# Patient Record
Sex: FEMALE | Race: WHITE | URBAN - METROPOLITAN AREA
[De-identification: names, ages, dates, MRNs, and addresses within clinical notes are randomized per-mention and may not be internally consistent; named-entity substitution may affect disease eponyms.]

---

## 2018-05-25 ENCOUNTER — TRANSFERRED RECORDS (OUTPATIENT)
Dept: HEALTH INFORMATION MANAGEMENT | Facility: CLINIC | Age: 58
End: 2018-05-25

## 2018-05-25 LAB
CREAT SERPL-MCNC: 0.82 MG/DL (ref 0.57–1.11)
GFR SERPL CREATININE-BSD FRML MDRD: >60 ML/MIN/1.73M2
GLUCOSE SERPL-MCNC: 104 MG/DL (ref 65–99)
PHQ9 SCORE: 6
POTASSIUM SERPL-SCNC: 3.9 MMOL/L (ref 3.5–5)

## 2018-06-03 PROBLEM — N85.02 COMPLEX ATYPICAL ENDOMETRIAL HYPERPLASIA: Status: ACTIVE | Noted: 2018-06-03

## 2018-06-03 PROBLEM — E78.5 HYPERLIPIDEMIA: Status: ACTIVE | Noted: 2018-06-03

## 2018-06-03 PROBLEM — E03.9 HYPOTHYROIDISM: Status: ACTIVE | Noted: 2018-06-03

## 2018-06-03 PROBLEM — G47.30 SLEEP APNEA: Status: ACTIVE | Noted: 2018-06-03

## 2018-06-03 PROBLEM — E66.01 MORBID OBESITY WITH BMI OF 40.0-44.9, ADULT (H): Status: ACTIVE | Noted: 2018-06-03

## 2018-06-03 PROBLEM — I10 HTN (HYPERTENSION): Status: ACTIVE | Noted: 2018-06-03

## 2018-06-06 RX ORDER — DOXYCYCLINE 100 MG/1
100 CAPSULE ORAL DAILY
COMMUNITY

## 2018-06-07 ENCOUNTER — HOSPITAL ENCOUNTER (OUTPATIENT)
Facility: CLINIC | Age: 58
Discharge: HOME | End: 2018-06-08
Attending: OBSTETRICS & GYNECOLOGY | Admitting: OBSTETRICS & GYNECOLOGY
Payer: COMMERCIAL

## 2018-06-07 ENCOUNTER — ANESTHESIA EVENT (OUTPATIENT)
Dept: SURGERY | Facility: CLINIC | Age: 58
End: 2018-06-07
Payer: COMMERCIAL

## 2018-06-07 ENCOUNTER — SURGERY (OUTPATIENT)
Age: 58
End: 2018-06-07

## 2018-06-07 ENCOUNTER — ANESTHESIA (OUTPATIENT)
Dept: SURGERY | Facility: CLINIC | Age: 58
End: 2018-06-07
Payer: COMMERCIAL

## 2018-06-07 DIAGNOSIS — B37.31 CANDIDIASIS OF VAGINA: ICD-10-CM

## 2018-06-07 DIAGNOSIS — N85.02 COMPLEX ATYPICAL ENDOMETRIAL HYPERPLASIA: Primary | ICD-10-CM

## 2018-06-07 PROBLEM — N85.01 COMPLEX ENDOMETRIAL HYPERPLASIA: Status: ACTIVE | Noted: 2018-06-07

## 2018-06-07 LAB — POTASSIUM SERPL-SCNC: 3.6 MMOL/L (ref 3.4–5.3)

## 2018-06-07 PROCEDURE — 88307 TISSUE EXAM BY PATHOLOGIST: CPT | Performed by: OBSTETRICS & GYNECOLOGY

## 2018-06-07 PROCEDURE — 25000128 H RX IP 250 OP 636: Performed by: NURSE ANESTHETIST, CERTIFIED REGISTERED

## 2018-06-07 PROCEDURE — 36000087 ZZH SURGERY LEVEL 8 EA 15 ADDTL MIN: Performed by: OBSTETRICS & GYNECOLOGY

## 2018-06-07 PROCEDURE — 36000085 ZZH SURGERY LEVEL 8 1ST 30 MIN: Performed by: OBSTETRICS & GYNECOLOGY

## 2018-06-07 PROCEDURE — 37000008 ZZH ANESTHESIA TECHNICAL FEE, 1ST 30 MIN: Performed by: OBSTETRICS & GYNECOLOGY

## 2018-06-07 PROCEDURE — 37000009 ZZH ANESTHESIA TECHNICAL FEE, EACH ADDTL 15 MIN: Performed by: OBSTETRICS & GYNECOLOGY

## 2018-06-07 PROCEDURE — 25000125 ZZHC RX 250: Performed by: NURSE ANESTHETIST, CERTIFIED REGISTERED

## 2018-06-07 PROCEDURE — 71000012 ZZH RECOVERY PHASE 1 LEVEL 1 FIRST HR: Performed by: OBSTETRICS & GYNECOLOGY

## 2018-06-07 PROCEDURE — 40000170 ZZH STATISTIC PRE-PROCEDURE ASSESSMENT II: Performed by: OBSTETRICS & GYNECOLOGY

## 2018-06-07 PROCEDURE — 36415 COLL VENOUS BLD VENIPUNCTURE: CPT | Performed by: ANESTHESIOLOGY

## 2018-06-07 PROCEDURE — 25000566 ZZH SEVOFLURANE, EA 15 MIN: Performed by: OBSTETRICS & GYNECOLOGY

## 2018-06-07 PROCEDURE — 25000128 H RX IP 250 OP 636: Performed by: OBSTETRICS & GYNECOLOGY

## 2018-06-07 PROCEDURE — 88331 PATH CONSLTJ SURG 1 BLK 1SPC: CPT | Performed by: OBSTETRICS & GYNECOLOGY

## 2018-06-07 PROCEDURE — 27210794 ZZH OR GENERAL SUPPLY STERILE: Performed by: OBSTETRICS & GYNECOLOGY

## 2018-06-07 PROCEDURE — 25000132 ZZH RX MED GY IP 250 OP 250 PS 637: Performed by: NURSE ANESTHETIST, CERTIFIED REGISTERED

## 2018-06-07 PROCEDURE — 25000128 H RX IP 250 OP 636: Performed by: NURSE PRACTITIONER

## 2018-06-07 PROCEDURE — P9041 ALBUMIN (HUMAN),5%, 50ML: HCPCS | Performed by: NURSE ANESTHETIST, CERTIFIED REGISTERED

## 2018-06-07 PROCEDURE — 71000013 ZZH RECOVERY PHASE 1 LEVEL 1 EA ADDTL HR: Performed by: OBSTETRICS & GYNECOLOGY

## 2018-06-07 PROCEDURE — 27210995 ZZH RX 272: Performed by: OBSTETRICS & GYNECOLOGY

## 2018-06-07 PROCEDURE — 25800025 ZZH RX 258: Performed by: OBSTETRICS & GYNECOLOGY

## 2018-06-07 PROCEDURE — 84132 ASSAY OF SERUM POTASSIUM: CPT | Performed by: ANESTHESIOLOGY

## 2018-06-07 PROCEDURE — 25000128 H RX IP 250 OP 636: Performed by: ANESTHESIOLOGY

## 2018-06-07 PROCEDURE — 88331 PATH CONSLTJ SURG 1 BLK 1SPC: CPT | Mod: 26 | Performed by: OBSTETRICS & GYNECOLOGY

## 2018-06-07 PROCEDURE — 25000125 ZZHC RX 250: Performed by: ANESTHESIOLOGY

## 2018-06-07 PROCEDURE — 25000125 ZZHC RX 250: Performed by: OBSTETRICS & GYNECOLOGY

## 2018-06-07 PROCEDURE — 25000132 ZZH RX MED GY IP 250 OP 250 PS 637: Performed by: NURSE PRACTITIONER

## 2018-06-07 PROCEDURE — 88307 TISSUE EXAM BY PATHOLOGIST: CPT | Mod: 26 | Performed by: OBSTETRICS & GYNECOLOGY

## 2018-06-07 RX ORDER — KETOROLAC TROMETHAMINE 30 MG/ML
30 INJECTION, SOLUTION INTRAMUSCULAR; INTRAVENOUS EVERY 6 HOURS PRN
Status: DISCONTINUED | OUTPATIENT
Start: 2018-06-07 | End: 2018-06-07 | Stop reason: HOSPADM

## 2018-06-07 RX ORDER — CEPHALEXIN 500 MG/1
500 CAPSULE ORAL EVERY 6 HOURS SCHEDULED
Status: DISCONTINUED | OUTPATIENT
Start: 2018-06-07 | End: 2018-06-07

## 2018-06-07 RX ORDER — PROMETHAZINE HYDROCHLORIDE 25 MG/ML
12.5 INJECTION, SOLUTION INTRAMUSCULAR; INTRAVENOUS
Status: DISCONTINUED | OUTPATIENT
Start: 2018-06-07 | End: 2018-06-07 | Stop reason: HOSPADM

## 2018-06-07 RX ORDER — HYDROMORPHONE HYDROCHLORIDE 1 MG/ML
.3-.5 INJECTION, SOLUTION INTRAMUSCULAR; INTRAVENOUS; SUBCUTANEOUS EVERY 10 MIN PRN
Status: DISCONTINUED | OUTPATIENT
Start: 2018-06-07 | End: 2018-06-07 | Stop reason: HOSPADM

## 2018-06-07 RX ORDER — DEXAMETHASONE SODIUM PHOSPHATE 4 MG/ML
INJECTION, SOLUTION INTRA-ARTICULAR; INTRALESIONAL; INTRAMUSCULAR; INTRAVENOUS; SOFT TISSUE PRN
Status: DISCONTINUED | OUTPATIENT
Start: 2018-06-07 | End: 2018-06-07

## 2018-06-07 RX ORDER — BUPIVACAINE HYDROCHLORIDE AND EPINEPHRINE 5; 5 MG/ML; UG/ML
INJECTION, SOLUTION PERINEURAL PRN
Status: DISCONTINUED | OUTPATIENT
Start: 2018-06-07 | End: 2018-06-07 | Stop reason: HOSPADM

## 2018-06-07 RX ORDER — CEFAZOLIN SODIUM 1 G/50ML
3 SOLUTION INTRAVENOUS
Status: COMPLETED | OUTPATIENT
Start: 2018-06-07 | End: 2018-06-07

## 2018-06-07 RX ORDER — DIMENHYDRINATE 50 MG/ML
12.5 INJECTION, SOLUTION INTRAMUSCULAR; INTRAVENOUS
Status: DISCONTINUED | OUTPATIENT
Start: 2018-06-07 | End: 2018-06-07 | Stop reason: HOSPADM

## 2018-06-07 RX ORDER — CEFAZOLIN SODIUM 1 G/3ML
1 INJECTION, POWDER, FOR SOLUTION INTRAMUSCULAR; INTRAVENOUS SEE ADMIN INSTRUCTIONS
Status: DISCONTINUED | OUTPATIENT
Start: 2018-06-07 | End: 2018-06-07 | Stop reason: HOSPADM

## 2018-06-07 RX ORDER — SCOLOPAMINE TRANSDERMAL SYSTEM 1 MG/1
1 PATCH, EXTENDED RELEASE TRANSDERMAL ONCE
Status: COMPLETED | OUTPATIENT
Start: 2018-06-07 | End: 2018-06-07

## 2018-06-07 RX ORDER — CEPHALEXIN 500 MG/1
500 CAPSULE ORAL EVERY 6 HOURS
Qty: 40 CAPSULE | Refills: 0 | Status: SHIPPED | OUTPATIENT
Start: 2018-06-07 | End: 2018-06-17

## 2018-06-07 RX ORDER — CEFAZOLIN SODIUM 1 G/3ML
1 INJECTION, POWDER, FOR SOLUTION INTRAMUSCULAR; INTRAVENOUS EVERY 8 HOURS
Status: COMPLETED | OUTPATIENT
Start: 2018-06-07 | End: 2018-06-07

## 2018-06-07 RX ORDER — NALOXONE HYDROCHLORIDE 0.4 MG/ML
.1-.4 INJECTION, SOLUTION INTRAMUSCULAR; INTRAVENOUS; SUBCUTANEOUS
Status: DISCONTINUED | OUTPATIENT
Start: 2018-06-07 | End: 2018-06-08 | Stop reason: HOSPADM

## 2018-06-07 RX ORDER — ONDANSETRON 4 MG/1
4 TABLET, ORALLY DISINTEGRATING ORAL EVERY 6 HOURS PRN
Status: DISCONTINUED | OUTPATIENT
Start: 2018-06-07 | End: 2018-06-08 | Stop reason: HOSPADM

## 2018-06-07 RX ORDER — ALBUTEROL SULFATE 90 UG/1
AEROSOL, METERED RESPIRATORY (INHALATION) PRN
Status: DISCONTINUED | OUTPATIENT
Start: 2018-06-07 | End: 2018-06-07

## 2018-06-07 RX ORDER — SODIUM CHLORIDE, SODIUM LACTATE, POTASSIUM CHLORIDE, CALCIUM CHLORIDE 600; 310; 30; 20 MG/100ML; MG/100ML; MG/100ML; MG/100ML
INJECTION, SOLUTION INTRAVENOUS CONTINUOUS
Status: DISCONTINUED | OUTPATIENT
Start: 2018-06-07 | End: 2018-06-07 | Stop reason: HOSPADM

## 2018-06-07 RX ORDER — ONDANSETRON 4 MG/1
4 TABLET, ORALLY DISINTEGRATING ORAL EVERY 30 MIN PRN
Status: DISCONTINUED | OUTPATIENT
Start: 2018-06-07 | End: 2018-06-07 | Stop reason: HOSPADM

## 2018-06-07 RX ORDER — FENTANYL CITRATE 50 UG/ML
25-50 INJECTION, SOLUTION INTRAMUSCULAR; INTRAVENOUS EVERY 5 MIN PRN
Status: DISCONTINUED | OUTPATIENT
Start: 2018-06-07 | End: 2018-06-07 | Stop reason: HOSPADM

## 2018-06-07 RX ORDER — IBUPROFEN 600 MG/1
600 TABLET, FILM COATED ORAL EVERY 6 HOURS PRN
Qty: 40 TABLET | Refills: 1 | Status: SHIPPED | OUTPATIENT
Start: 2018-06-07

## 2018-06-07 RX ORDER — ACETAMINOPHEN 500 MG
1000 TABLET ORAL ONCE
Status: DISCONTINUED | OUTPATIENT
Start: 2018-06-07 | End: 2018-06-07 | Stop reason: HOSPADM

## 2018-06-07 RX ORDER — LORAZEPAM 2 MG/ML
.5-1 INJECTION INTRAMUSCULAR
Status: DISCONTINUED | OUTPATIENT
Start: 2018-06-07 | End: 2018-06-07 | Stop reason: HOSPADM

## 2018-06-07 RX ORDER — MAGNESIUM HYDROXIDE 1200 MG/15ML
LIQUID ORAL PRN
Status: DISCONTINUED | OUTPATIENT
Start: 2018-06-07 | End: 2018-06-07 | Stop reason: HOSPADM

## 2018-06-07 RX ORDER — EPHEDRINE SULFATE 50 MG/ML
INJECTION, SOLUTION INTRAMUSCULAR; INTRAVENOUS; SUBCUTANEOUS PRN
Status: DISCONTINUED | OUTPATIENT
Start: 2018-06-07 | End: 2018-06-07

## 2018-06-07 RX ORDER — ONDANSETRON 2 MG/ML
4 INJECTION INTRAMUSCULAR; INTRAVENOUS EVERY 6 HOURS PRN
Status: DISCONTINUED | OUTPATIENT
Start: 2018-06-07 | End: 2018-06-08 | Stop reason: HOSPADM

## 2018-06-07 RX ORDER — PROPOFOL 10 MG/ML
INJECTION, EMULSION INTRAVENOUS CONTINUOUS PRN
Status: DISCONTINUED | OUTPATIENT
Start: 2018-06-07 | End: 2018-06-07

## 2018-06-07 RX ORDER — ONDANSETRON 2 MG/ML
4 INJECTION INTRAMUSCULAR; INTRAVENOUS EVERY 30 MIN PRN
Status: DISCONTINUED | OUTPATIENT
Start: 2018-06-07 | End: 2018-06-07 | Stop reason: HOSPADM

## 2018-06-07 RX ORDER — NALOXONE HYDROCHLORIDE 0.4 MG/ML
.1-.4 INJECTION, SOLUTION INTRAMUSCULAR; INTRAVENOUS; SUBCUTANEOUS
Status: DISCONTINUED | OUTPATIENT
Start: 2018-06-07 | End: 2018-06-07 | Stop reason: HOSPADM

## 2018-06-07 RX ORDER — PROPOFOL 10 MG/ML
INJECTION, EMULSION INTRAVENOUS PRN
Status: DISCONTINUED | OUTPATIENT
Start: 2018-06-07 | End: 2018-06-07

## 2018-06-07 RX ORDER — ALBUMIN, HUMAN INJ 5% 5 %
SOLUTION INTRAVENOUS CONTINUOUS PRN
Status: DISCONTINUED | OUTPATIENT
Start: 2018-06-07 | End: 2018-06-07

## 2018-06-07 RX ORDER — MEPERIDINE HYDROCHLORIDE 25 MG/ML
12.5 INJECTION INTRAMUSCULAR; INTRAVENOUS; SUBCUTANEOUS
Status: DISCONTINUED | OUTPATIENT
Start: 2018-06-07 | End: 2018-06-07 | Stop reason: HOSPADM

## 2018-06-07 RX ORDER — LIDOCAINE HYDROCHLORIDE 20 MG/ML
INJECTION, SOLUTION INFILTRATION; PERINEURAL PRN
Status: DISCONTINUED | OUTPATIENT
Start: 2018-06-07 | End: 2018-06-07

## 2018-06-07 RX ORDER — HYDROCODONE BITARTRATE AND ACETAMINOPHEN 5; 325 MG/1; MG/1
1-2 TABLET ORAL EVERY 4 HOURS PRN
Status: DISCONTINUED | OUTPATIENT
Start: 2018-06-07 | End: 2018-06-08 | Stop reason: HOSPADM

## 2018-06-07 RX ORDER — DEXAMETHASONE SODIUM PHOSPHATE 4 MG/ML
4 INJECTION, SOLUTION INTRA-ARTICULAR; INTRALESIONAL; INTRAMUSCULAR; INTRAVENOUS; SOFT TISSUE EVERY 10 MIN PRN
Status: DISCONTINUED | OUTPATIENT
Start: 2018-06-07 | End: 2018-06-07 | Stop reason: HOSPADM

## 2018-06-07 RX ORDER — SENNOSIDES A AND B 8.6 MG/1
1-3 TABLET, FILM COATED ORAL 2 TIMES DAILY PRN
Qty: 30 TABLET | Refills: 0 | Status: SHIPPED | OUTPATIENT
Start: 2018-06-07

## 2018-06-07 RX ORDER — ALBUTEROL SULFATE 0.83 MG/ML
2.5 SOLUTION RESPIRATORY (INHALATION)
Status: DISCONTINUED | OUTPATIENT
Start: 2018-06-07 | End: 2018-06-07 | Stop reason: HOSPADM

## 2018-06-07 RX ORDER — SODIUM CHLORIDE, SODIUM LACTATE, POTASSIUM CHLORIDE, CALCIUM CHLORIDE 600; 310; 30; 20 MG/100ML; MG/100ML; MG/100ML; MG/100ML
INJECTION, SOLUTION INTRAVENOUS CONTINUOUS PRN
Status: DISCONTINUED | OUTPATIENT
Start: 2018-06-07 | End: 2018-06-07

## 2018-06-07 RX ORDER — LIDOCAINE 40 MG/G
CREAM TOPICAL
Status: DISCONTINUED | OUTPATIENT
Start: 2018-06-07 | End: 2018-06-08 | Stop reason: HOSPADM

## 2018-06-07 RX ORDER — HYDROMORPHONE HYDROCHLORIDE 1 MG/ML
0.2 INJECTION, SOLUTION INTRAMUSCULAR; INTRAVENOUS; SUBCUTANEOUS
Status: DISCONTINUED | OUTPATIENT
Start: 2018-06-07 | End: 2018-06-08 | Stop reason: HOSPADM

## 2018-06-07 RX ORDER — HYDROCODONE BITARTRATE AND ACETAMINOPHEN 5; 325 MG/1; MG/1
1-2 TABLET ORAL EVERY 4 HOURS PRN
Qty: 15 TABLET | Refills: 0 | Status: SHIPPED | OUTPATIENT
Start: 2018-06-07

## 2018-06-07 RX ORDER — FENTANYL CITRATE 50 UG/ML
INJECTION, SOLUTION INTRAMUSCULAR; INTRAVENOUS PRN
Status: DISCONTINUED | OUTPATIENT
Start: 2018-06-07 | End: 2018-06-07

## 2018-06-07 RX ORDER — ONDANSETRON 2 MG/ML
INJECTION INTRAMUSCULAR; INTRAVENOUS PRN
Status: DISCONTINUED | OUTPATIENT
Start: 2018-06-07 | End: 2018-06-07

## 2018-06-07 RX ADMIN — PHENYLEPHRINE HYDROCHLORIDE 50 MCG: 10 INJECTION, SOLUTION INTRAMUSCULAR; INTRAVENOUS; SUBCUTANEOUS at 12:59

## 2018-06-07 RX ADMIN — PROPOFOL 30 MCG/KG/MIN: 10 INJECTION, EMULSION INTRAVENOUS at 12:48

## 2018-06-07 RX ADMIN — HYDROMORPHONE HYDROCHLORIDE 0.2 MG: 1 INJECTION, SOLUTION INTRAMUSCULAR; INTRAVENOUS; SUBCUTANEOUS at 19:26

## 2018-06-07 RX ADMIN — Medication 10 MG: at 12:49

## 2018-06-07 RX ADMIN — ALBUTEROL SULFATE 2 PUFF: 90 AEROSOL, METERED RESPIRATORY (INHALATION) at 12:49

## 2018-06-07 RX ADMIN — PHENYLEPHRINE HYDROCHLORIDE 100 MCG: 10 INJECTION, SOLUTION INTRAMUSCULAR; INTRAVENOUS; SUBCUTANEOUS at 14:14

## 2018-06-07 RX ADMIN — KETOROLAC TROMETHAMINE 30 MG: 30 INJECTION, SOLUTION INTRAMUSCULAR at 15:25

## 2018-06-07 RX ADMIN — HYDROMORPHONE HYDROCHLORIDE 0.2 MG: 1 INJECTION, SOLUTION INTRAMUSCULAR; INTRAVENOUS; SUBCUTANEOUS at 17:10

## 2018-06-07 RX ADMIN — PHENYLEPHRINE HYDROCHLORIDE 100 MCG: 10 INJECTION, SOLUTION INTRAMUSCULAR; INTRAVENOUS; SUBCUTANEOUS at 14:18

## 2018-06-07 RX ADMIN — SCOPALAMINE 1 PATCH: 1 PATCH, EXTENDED RELEASE TRANSDERMAL at 12:09

## 2018-06-07 RX ADMIN — SODIUM CHLORIDE 1000 ML: 900 IRRIGANT IRRIGATION at 13:09

## 2018-06-07 RX ADMIN — DEXAMETHASONE SODIUM PHOSPHATE 4 MG: 4 INJECTION, SOLUTION INTRA-ARTICULAR; INTRALESIONAL; INTRAMUSCULAR; INTRAVENOUS; SOFT TISSUE at 13:17

## 2018-06-07 RX ADMIN — CEFAZOLIN SODIUM 1 G: 1 INJECTION, POWDER, FOR SOLUTION INTRAMUSCULAR; INTRAVENOUS at 22:03

## 2018-06-07 RX ADMIN — HYDROCODONE BITARTRATE AND ACETAMINOPHEN 1 TABLET: 5; 325 TABLET ORAL at 22:03

## 2018-06-07 RX ADMIN — HYDROMORPHONE HYDROCHLORIDE 0.5 MG: 1 INJECTION, SOLUTION INTRAMUSCULAR; INTRAVENOUS; SUBCUTANEOUS at 14:55

## 2018-06-07 RX ADMIN — FENTANYL CITRATE 50 MCG: 50 INJECTION, SOLUTION INTRAMUSCULAR; INTRAVENOUS at 13:20

## 2018-06-07 RX ADMIN — ALBUTEROL SULFATE 2 PUFF: 90 AEROSOL, METERED RESPIRATORY (INHALATION) at 12:48

## 2018-06-07 RX ADMIN — FENTANYL CITRATE 50 MCG: 50 INJECTION, SOLUTION INTRAMUSCULAR; INTRAVENOUS at 14:21

## 2018-06-07 RX ADMIN — PHENYLEPHRINE HYDROCHLORIDE 100 MCG: 10 INJECTION, SOLUTION INTRAMUSCULAR; INTRAVENOUS; SUBCUTANEOUS at 14:03

## 2018-06-07 RX ADMIN — ALBUMIN (HUMAN): 12.5 SOLUTION INTRAVENOUS at 13:24

## 2018-06-07 RX ADMIN — BUPIVACAINE HYDROCHLORIDE AND EPINEPHRINE BITARTRATE 60 ML: 5; .005 INJECTION, SOLUTION PERINEURAL at 13:10

## 2018-06-07 RX ADMIN — FENTANYL CITRATE 100 MCG: 50 INJECTION, SOLUTION INTRAMUSCULAR; INTRAVENOUS at 12:43

## 2018-06-07 RX ADMIN — SODIUM CHLORIDE, POTASSIUM CHLORIDE, SODIUM LACTATE AND CALCIUM CHLORIDE: 600; 310; 30; 20 INJECTION, SOLUTION INTRAVENOUS at 14:12

## 2018-06-07 RX ADMIN — Medication 3 G: at 12:53

## 2018-06-07 RX ADMIN — MIDAZOLAM 2 MG: 1 INJECTION INTRAMUSCULAR; INTRAVENOUS at 12:36

## 2018-06-07 RX ADMIN — FENTANYL CITRATE 50 MCG: 50 INJECTION, SOLUTION INTRAMUSCULAR; INTRAVENOUS at 15:06

## 2018-06-07 RX ADMIN — PROPOFOL 150 MG: 10 INJECTION, EMULSION INTRAVENOUS at 12:43

## 2018-06-07 RX ADMIN — ALBUTEROL SULFATE 2 PUFF: 90 AEROSOL, METERED RESPIRATORY (INHALATION) at 12:50

## 2018-06-07 RX ADMIN — HYDROMORPHONE HYDROCHLORIDE 0.5 MG: 1 INJECTION, SOLUTION INTRAMUSCULAR; INTRAVENOUS; SUBCUTANEOUS at 15:34

## 2018-06-07 RX ADMIN — CEFAZOLIN SODIUM 1 G: 1 INJECTION, POWDER, FOR SOLUTION INTRAMUSCULAR; INTRAVENOUS at 17:08

## 2018-06-07 RX ADMIN — LIDOCAINE HYDROCHLORIDE 100 MG: 20 INJECTION, SOLUTION INFILTRATION; PERINEURAL at 12:43

## 2018-06-07 RX ADMIN — SODIUM CHLORIDE, POTASSIUM CHLORIDE, SODIUM LACTATE AND CALCIUM CHLORIDE: 600; 310; 30; 20 INJECTION, SOLUTION INTRAVENOUS at 12:35

## 2018-06-07 RX ADMIN — ONDANSETRON 4 MG: 2 INJECTION INTRAMUSCULAR; INTRAVENOUS at 13:17

## 2018-06-07 RX ADMIN — Medication 5 MG: at 14:18

## 2018-06-07 RX ADMIN — SUCCINYLCHOLINE CHLORIDE 100 MG: 20 INJECTION, SOLUTION INTRAMUSCULAR; INTRAVENOUS; PARENTERAL at 12:43

## 2018-06-07 RX ADMIN — FENTANYL CITRATE 50 MCG: 50 INJECTION, SOLUTION INTRAMUSCULAR; INTRAVENOUS at 13:21

## 2018-06-07 RX ADMIN — FENTANYL CITRATE 50 MCG: 50 INJECTION, SOLUTION INTRAMUSCULAR; INTRAVENOUS at 15:13

## 2018-06-07 RX ADMIN — SODIUM CHLORIDE 1000 ML: 900 IRRIGANT IRRIGATION at 13:10

## 2018-06-07 RX ADMIN — ROCURONIUM BROMIDE 40 MG: 10 INJECTION INTRAVENOUS at 12:55

## 2018-06-07 RX ADMIN — ROCURONIUM BROMIDE 10 MG: 10 INJECTION INTRAVENOUS at 13:21

## 2018-06-07 ASSESSMENT — LIFESTYLE VARIABLES: TOBACCO_USE: 1

## 2018-06-07 ASSESSMENT — COPD QUESTIONNAIRES: COPD: 1

## 2018-06-07 NOTE — IP AVS SNAPSHOT
MRN:[id number]                      After Visit Summary   6/7/2018    Merlyn Kirby    MRN: [id number]           Thank you!     Thank you for choosing [facility] for your care. Our goal is always to provide you with excellent care. Hearing back from our patients is one way we can continue to improve our services. Please take a few minutes to complete the written survey that you may receive in the mail after you visit with us. Thank you!        Patient Information     Date Of Birth          1960        About your hospital stay     You were admitted on:  June 7, 2018 You last received care in the:  [place] [facility] [address] [facility]    You were discharged on:  June 8, 2018       Who to Call     For medical emergencies, please call 911.  For non-urgent questions about your medical care, please call your primary care provider or clinic, [phone]  For questions related to your surgery, please call your surgery clinic        Attending Provider     Provider Mratha Gracia MD Oncology       Primary Care Provider Office Phone # Fax #    Kailee Biggs [phone]      After Care Instructions     Discharge Instructions       Discharge instructions following your gynecologic procedure:  Returning to work/activities:  -Nothing per vagina for 8 weeks  -Typically patients can expect to return to light work within 2-4 weeks.  -No driving or operating machinery while taking prescription pain medication.  -You should avoid heavy lifting until your follow up visit. No lifting greater than 20 pounds for 2 weeks.     Diet:  -as tolerated    Pain:  -Motrin (or other NSAIDs) are a good additional therapy and recommend trying this in addition to other pain relievers.  -Typically there is a minimal to moderate amount of incisional pain after surgery.  - An ice pack is recommended intermittently for the first 48 hours to help reduce pain & swelling.  -Most patients are provided  a prescription for medication to take on a short term basis to help relieve the discomfort.  -If pain medication refills are needed we require you contact our office during regular business hours. (8am-5pm Monday-Friday)  -Please be aware that pain medication can cause constipation.  It may be recommended to take an over the counter stool softener as directed to prevent constipation.     Constipation:  -The pain medication you are prescribed at the time of your surgery can cause constipation.   -We recommend that you take an over the counter stool softener such as colace or senokot-s on a regular basis until you have stopped the pain medication or bowel movements are regular. You make take 1-4 tablets twice per day.   -For constipation lasting 3 days please take Milk of Magnesia or Miralax as directed on the bottles. If you have taken Milk of Magnesia and Miralax and still have not had a bowel movement please contact your our office.    Incision/Wound care:  -Leave your steri-strips in place until your post op visit.   -If you have a clear plastic dressing over a gauze on your incision, remove these 1-2 days after discharging from the hospital.   -You many shower 24hrs after surgery.  It is ok to get the steri-strips/incision wet while showering & pat the area dry with a clean towel  -No bathtubs, hot tubs or swimming is recommended for at least 2 weeks.    Vaginal drainage/spotting:  -Following a hysterectomy you may have vaginal drainage/spotting for up to 4-6 weeks from surgery. If more than a regular period please call our office.     Bladder symptoms:  -You may also have bladder irritation of difficulty starting urination from your surgery and this is normal. Please call if you have pain or burning when you urinate or fevers.     Follow up care:  -You will be asked to see Dr. Mayes's Nurse Practitioner in 1 week and in 8 weeks.   -If you don't already have an appointment, please contact the office and our staff  "will happily assist you in scheduling your appointment. Bring your insurance card & government issued ID card to your appointment.    CALL YOUR SURGEON @[phone] FOR ANY OF THE FOLLOWING:  -Temperature greater than 101 degrees Fahrenheit  -Increased pain  -Increased shortness of breath  -Increased bleeding or drainage or vaginal bleeding greater than a regular menses.   -Pus-like drainage, increasing redness, swelling, tenderness, or warmth at the incision site  -Persistent nausea or vomiting                  Pending Results     Date and Time Order Name Status Description    2018 1359 Surgical pathology exam Preliminary             Admission Information     Date & Time Provider Department Dept. Phone    2018 Martha Mayes MD [place] [address] [facility] [phone]      Your Vitals Were     Blood Pressure Temperature Respirations Height Weight Pulse Oximetry    96/49 (BP Location: Right arm) 96.8  F (36  C) (Oral) 16 1.6 m (5' 3\") 111.1 kg (244 lb 14.4 oz) 96%    BMI (Body Mass Index)                   43.38 kg/m2           [employer] Information     [employer] lets you send messages to your doctor, view your test results, renew your prescriptions, schedule appointments and more. To sign up, go to [email or web address] . Click on \"Log in\" on the left side of the screen, which will take you to the Welcome page. Then click on \"Sign up Now\" on the right side of the page.     You will be asked to enter the access code listed below, as well as some personal information. Please follow the directions to create your username and password.     Your access code is: [email or web address]  Expires: 2018 12:32 PM     Your access code will  in 90 days. If you need help or a new code, please call your [facility] clinic or [phone].        Care EveryWhere ID     This is your Care EveryWhere ID. This could be used by other organizations to access your [facility] medical records  [id number]        Equal Access " to Services     [facility] : Hadii rodriguez Thurston, wawendyda foreign, qaybta kaalsantana galicia, luis farmer. So [facility] [phone].    ATENCIÓN: Si habla raman, tiene a rashid disposición servicios gratuitos de asistencia lingüística. Llame al [phone].    We comply with applicable federal civil rights laws and [place] laws. We do not discriminate on the basis of race, color, national origin, age, disability, sex, sexual orientation, or gender identity.               Review of your medicines      START taking        Dose / Directions    cephALEXin 500 MG capsule   Commonly known as:  KEFLEX   Indication:  Infection of the Skin and/or Related Soft Tissue   Used for:  Complex atypical endometrial hyperplasia        Dose:  500 mg   Take 1 capsule (500 mg) by mouth every 6 hours for 10 days   Quantity:  40 capsule   Refills:  0       fluconazole 150 MG tablet   Commonly known as:  DIFLUCAN   Used for:  Candidiasis of vagina        Dose:  150 mg   Take 1 tablet (150 mg) by mouth once for 1 dose   Quantity:  1 tablet   Refills:  0       HYDROcodone-acetaminophen 5-325 MG per tablet   Commonly known as:  NORCO   Used for:  Complex atypical endometrial hyperplasia        Dose:  1-2 tablet   Take 1-2 tablets by mouth every 4 hours as needed for pain maximum 10 tablet(s) per day   Quantity:  15 tablet   Refills:  0       ibuprofen 600 MG tablet   Commonly known as:  ADVIL/MOTRIN   Used for:  Complex atypical endometrial hyperplasia        Dose:  600 mg   Take 1 tablet (600 mg) by mouth every 6 hours as needed for moderate pain   Quantity:  40 tablet   Refills:  1       senna 8.6 MG tablet   Commonly known as:  SENOKOT   Used for:  Complex atypical endometrial hyperplasia        Dose:  1-3 tablet   Take 1-3 tablets by mouth 2 times daily as needed for constipation   Quantity:  30 tablet   Refills:  0         CONTINUE these medicines which have NOT CHANGED        Dose / Directions     COZAAR PO        Dose:  50 mg   Take 50 mg by mouth daily   Refills:  0       doxycycline monohydrate 100 MG capsule        Dose:  100 mg   Take 100 mg by mouth daily   Refills:  0       HYDROCHLOROTHIAZIDE PO        Dose:  50 mg   Take 50 mg by mouth daily   Refills:  0       SYNTHROID PO        Dose:  100 mcg   Take 100 mcg by mouth daily   Refills:  0       TOPROL XL PO        Dose:  50 mg   Take 50 mg by mouth daily   Refills:  0            Where to get your medicines      These medications were sent to [facility] - [address]  [address], [place] [address]     Phone:  [phone]     cephALEXin 500 MG capsule    fluconazole 150 MG tablet    senna 8.6 MG tablet         Some of these will need a paper prescription and others can be bought over the counter. Ask your nurse if you have questions.     Bring a paper prescription for each of these medications     HYDROcodone-acetaminophen 5-325 MG per tablet    ibuprofen 600 MG tablet                Protect others around you: Learn how to safely use, store and throw away your medicines at www.disposemymeds.org.        ANTIBIOTIC INSTRUCTION     You've Been Prescribed an Antibiotic - Now What?  Your healthcare team thinks that you or your loved one might have an infection. Some infections can be treated with antibiotics, which are powerful, life-saving drugs. Like all medications, antibiotics have side effects and should only be used when necessary. There are some important things you should know about your antibiotic treatment.      Your healthcare team may run tests before you start taking an antibiotic.    Your team may take samples (e.g., from your blood, urine or other areas) to run tests to look for bacteria. These test can be important to determine if you need an antibiotic at all and, if you do, which antibiotic will work best.      Within a few days, your healthcare team might change or even stop your  antibiotic.    Your team may start you on an antibiotic while they are working to find out what is making you sick.    Your team might change your antibiotic because test results show that a different antibiotic would be better to treat your infection.    In some cases, once your team has more information, they learn that you do not need an antibiotic at all. They may find out that you don't have an infection, or that the antibiotic you're taking won't work against your infection. For example, an infection caused by a virus can't be treated with antibiotics. Staying on an antibiotic when you don't need it is more likely to be harmful than helpful.      You may experience side effects from your antibiotic.    Like all medications, antibiotics have side effects. Some of these can be serious.    Let you healthcare team know if you have any known allergies when you are admitted to the hospital.    One significant side effect of nearly all antibiotics is the risk of severe and sometimes deadly diarrhea caused by Clostridium difficile (C. Difficile). This occurs when a person takes antibiotics because some good germs are destroyed. Antibiotic use allows C. diificile to take over, putting patients at high risk for this serious infection.    As a patient or caregiver, it is important to understand your or your loved one's antibiotic treatment. It is especially important for caregivers to speak up when patients can't speak for themselves. Here are some important questions to ask your healthcare team.    What infection is this antibiotic treating and how do you know I have that infection?    What side effects might occur from this antibiotic?    How long will I need to take this antibiotic?    Is it safe to take this antibiotic with other medications or supplements (e.g., vitamins) that I am taking?     Are there any special directions I need to know about taking this antibiotic? For example, should I take it with  food?    How will I be monitored to know whether my infection is responding to the antibiotic?    What tests may help to make sure the right antibiotic is prescribed for me?      Information provided by:  www.cdc.gov/getsmart  U.S. Department of Health and Human Services  Centers for disease Control and Prevention  National Center for Emerging and Zoonotic Infectious Diseases  Division of Healthcare Quality Promotion        Information about OPIOIDS     PRESCRIPTION OPIOIDS: WHAT YOU NEED TO KNOW   You have a prescription for an opioid (narcotic) pain medicine. Opioids can cause addiction. If you have a history of chemical dependency of any type, you are at a higher risk of becoming addicted to opioids. Only take this medicine after all other options have been tried. Take it for as short a time and as few doses as possible.     Do not:    Drive. If you drive while taking these medicines, you could be arrested for driving under the influence (DUI).    Operate heavy machinery    Do any other dangerous activities while taking these medicines.     Drink any alcohol while taking these medicines.      Take with any other medicines that contain acetaminophen. Read all labels carefully. Look for the word  acetaminophen  or  Tylenol.  Ask your pharmacist if you have questions or are unsure.    Store your pills in a secure place, locked if possible. We will not replace any lost or stolen medicine. If you don t finish your medicine, please throw away (dispose) as directed by your pharmacist. The [place] Pollution Control Agency has more information about safe disposal: https://www.pca.[facility].[place].us/living-green/managing-unwanted-medications    All opioids tend to cause constipation. Drink plenty of water and eat foods that have a lot of fiber, such as fruits, vegetables, prune juice, apple juice and high-fiber cereal. Take a laxative (Miralax, milk of magnesia, Colace, Senna) if you don t move your bowels at least every other  day.              Medication List: This is a list of all your medications and when to take them. Check marks below indicate your daily home schedule. Keep this list as a reference.      Medications           Morning Afternoon Evening Bedtime As Needed    cephALEXin 500 MG capsule   Commonly known as:  KEFLEX   Take 1 capsule (500 mg) by mouth every 6 hours for 10 days                                COZAAR PO   Take 50 mg by mouth daily                                doxycycline monohydrate 100 MG capsule   Take 100 mg by mouth daily                                fluconazole 150 MG tablet   Commonly known as:  DIFLUCAN   Take 1 tablet (150 mg) by mouth once for 1 dose                                HYDROCHLOROTHIAZIDE PO   Take 50 mg by mouth daily                                HYDROcodone-acetaminophen 5-325 MG per tablet   Commonly known as:  NORCO   Take 1-2 tablets by mouth every 4 hours as needed for pain maximum 10 tablet(s) per day   Last time this was given:  2 tablets on 6/8/2018 11:30 AM                                ibuprofen 600 MG tablet   Commonly known as:  ADVIL/MOTRIN   Take 1 tablet (600 mg) by mouth every 6 hours as needed for moderate pain                                senna 8.6 MG tablet   Commonly known as:  SENOKOT   Take 1-3 tablets by mouth 2 times daily as needed for constipation                                SYNTHROID PO   Take 100 mcg by mouth daily                                TOPROL XL PO   Take 50 mg by mouth daily

## 2018-06-07 NOTE — OR NURSING
Patient states she has a pimple on her right inner thigh.  Patient states it is closed and not draining.

## 2018-06-07 NOTE — ANESTHESIA PREPROCEDURE EVALUATION
Anesthesia Evaluation     . Pt has had prior anesthetic. Type: General    History of anesthetic complications   - PONV        ROS/MED HX    ENT/Pulmonary:     (+)sleep apnea, tobacco use, Current use COPD, uses CPAP , . .    Neurologic:  - neg neurologic ROS     Cardiovascular:     (+) Dyslipidemia, hypertension----. : . . . :. .       METS/Exercise Tolerance:     Hematologic:  - neg hematologic  ROS       Musculoskeletal:         GI/Hepatic:        (-) GERD   Renal/Genitourinary:         Endo: Comment: BMI 43    (+) thyroid problem hypothyroidism, Obesity, .      Psychiatric:     (+) psychiatric history depression      Infectious Disease:         Malignancy:         Other:                     Physical Exam  Normal systems: dental    Airway   Mallampati: III  TM distance: >3 FB  Neck ROM: full  Comment: Anterior larynx    Dental     Cardiovascular   Rhythm and rate: regular      Pulmonary    breath sounds clear to auscultation                    Anesthesia Plan      History & Physical Review  History and physical reviewed and following examination; no interval change.    ASA Status:  3 .    NPO Status:  > 8 hours    Plan for General and ETT with Intravenous and Propofol induction. Maintenance will be Balanced.    PONV prophylaxis:  Ondansetron (or other 5HT-3), Dexamethasone or Solumedrol and Scopolamine patch  Plan on DL up front, with glidescope as needed      Postoperative Care  Postoperative pain management:  Multi-modal analgesia.      Consents  Anesthetic plan, risks, benefits and alternatives discussed with:  Patient..        Procedure: Procedure(s):  COMBINED DAVINCI XI HYSTERECTOMY TOTAL, SALPINGO-OOPHORECTOMY, NODE DISSECTION, TUMOR STAGING  LAPAROTOMY EXPLORATORY  Preop diagnosis: COMPLEX ENDOMETRIAL HYPERPLASIA AND POSSIBLE ATYPIA     Allergies   Allergen Reactions     Lisinopril Other (See Comments)     ANGIOEDEMA     Ativan [Lorazepam] Other (See Comments)     CONFUSION     Past Medical History:    Diagnosis Date     Complex endometrial hyperplasia      Past Surgical History:   Procedure Laterality Date     GI SURGERY      SIGMOID COLECTOMY     GYN SURGERY      HYSTEROSCOPY  D+C     GYN SURGERY       SUCTION D+C     GYN SURGERY       TUBAL LIGATION     HERNIA REPAIR       ORTHOPEDIC SURGERY Right     ELBOW BURSECTOMY     Social History   Substance Use Topics     Smoking status: Current Every Day Smoker     Packs/day: 0.75     Years: 20.00     Types: Cigarettes     Smokeless tobacco: Never Used     Alcohol use Yes      Comment: 2 BOTTLES OF WINE / WEEK     Prior to Admission medications    Medication Sig Start Date End Date Taking? Authorizing Provider   doxycycline monohydrate 100 MG capsule Take 100 mg by mouth daily   Yes Reported, Patient   Levothyroxine Sodium (SYNTHROID PO) Take 100 mcg by mouth daily   Yes Reported, Patient   Losartan Potassium (COZAAR PO) Take 50 mg by mouth daily   Yes Reported, Patient   MedroxyPROGESTERone Acetate (PROVERA PO) Take 20 mg by mouth daily   Yes Reported, Patient   Metoprolol Succinate (TOPROL XL PO) Take 50 mg by mouth daily   Yes Reported, Patient     Current Facility-Administered Medications Ordered in Epic   Medication Dose Route Frequency Last Rate Last Dose     ceFAZolin (ANCEF) 1 g vial to attach to  ml bag for ADULT or 50 ml bag for PEDS  1 g Intravenous See Admin Instructions         ceFAZolin (ANCEF) intermittent infusion 3 g (pre-mix)  3 g Intravenous Pre-Op/Pre-procedure x 1 dose         No current [facility]-ordered outpatient prescriptions on file.       Wt Readings from Last 1 Encounters:   06/07/18 111.1 kg (244 lb 14.4 oz)     Temp Readings from Last 1 Encounters:   06/07/18 35.7  C (96.3  F) (Oral)     BP Readings from Last 6 Encounters:   06/07/18 184/90     Pulse Readings from Last 4 Encounters:   No data found for Pulse     Resp Readings from Last 1 Encounters:   06/07/18 16     SpO2 Readings from Last 1 Encounters:   06/07/18 96%

## 2018-06-07 NOTE — ANESTHESIA CARE TRANSFER NOTE
Patient: Merlyn Kirby    Procedure(s):  ROBOTIC ASSISTED XI TOTAL LAPAROSCOPIC HYSTERECTOMY; BILATERAL SALPINGO-OOPHORECTOMY - Wound Class: II-Clean Contaminated    Diagnosis: COMPLEX ENDOMETRIAL HYPERPLASIA AND POSSIBLE ATYPIA   Diagnosis Additional Information: No value filed.    Anesthesia Type:   General, ETT     Note:  Airway :Face Mask  Patient transferred to:PACU  Comments: Pt to PACU. VSS. Report complete to RNHandoff Report: Identifed the Patient, Identified the Reponsible Provider, Reviewed the pertinent medical history, Discussed the surgical course, Reviewed Intra-OP anesthesia mangement and issues during anesthesia, Set expectations for post-procedure period and Allowed opportunity for questions and acknowledgement of understanding      Vitals: (Last set prior to Anesthesia Care Transfer)    CRNA VITALS  6/7/2018 1416 - 6/7/2018 1446      6/7/2018             Resp Rate (set): 10                Electronically Signed By: Sivan Meadows CRNA, APRN CRNA  June 7, 2018  2:46 PM

## 2018-06-07 NOTE — OP NOTE
Procedure Date: 06/07/2018      DATE OF SERVICE:  06/07/2018      PREOPERATIVE DIAGNOSIS:  Complex atypical endometrial hyperplasia.  A body mass index of 42/morbid obesity.      POSTOPERATIVE DIAGNOSIS:  Complex atypical endometrial hyperplasia a body mass index of 42/morbid obesity.      PROCEDURE:  Robotic-assisted total laparoscopic hysterectomy, bilateral salpingo-oophorectomy, lysis of adhesions.      SURGEON:  MICHAEL Mayes MD      ASSISTANT:  BHARTI Quesada.      ANESTHESIA: General endotracheal anesthesia.      INDICATIONS FOR THE PROCEDURE:  the patient is a 57-year-old female who underwent menopause in her later 40s.  She presented complaining of postmenopausal bleeding with passage of blood and clots of 2 weeks' duration in March 2018.  She was then taken to the operating room by Dr. Nevin Castaneda and underwent a hysteroscopy to clear polypectomy and D&C.  Endometrium was thickened and fluffy.  Pathology revealed portions of the endometrium suggestive of an endometrial polyp and an inactive endometrium with pseudodecidual changes placed on hormonal therapy.  On 04/17/2018 she presented to [facility] with further vaginal bleeding after the procedure and hemoglobin continued to decrease and the bleeding continued.  She was evaluated again by Dr. Rodriguez this time and taken to the operating room.  Pelvic ultrasound had shown an irregular thickened endometrial stripe ranging from 7-12 mm.  She underwent a hysteroscopy, D&C and pathology revealed focal complex hyperplasia and a fragment of endometrial polyp. In 1 fragment of polyp there was unusual intraglandular proliferation of endometrial glands with a monotono cytology.  Definitive diagnosis of atypia was not possible.  She was given a prescription for progestin therapy if bleeding resumed.  She was then referred to me and we elected to proceed with robotic hysterectomy.  She did have a history of a prior sigmoidectomy and ventral hernia  repair and had a large midline incision.      FINDINGS:  The patient was found to have some filmy omental adhesions to the inside of the hernia repair.  She had adhesions of the sigmoid colon to the right pelvic sidewall and to the posterior aspect of the uterus and adnexa that were fairly filmy in nature and were easily taken down.      PROCEDURE IN DETAIL:  The patient was taken to the operating room.  She received broad-spectrum antibiotic prophylaxis with cephazolin. Pneumatic compression stockings were placed on her lower extremities.  She was brought to the operating room, placed in a supine position on a pink pad.  General endotracheal anesthesia was administered in the usual fashion.  She was then repositioned in low lithotomy position using Yellofin stirrups.  Her arms were padded and held at her side with a combination of draw sheet and sleds.  The shoulder braces were applied to her shoulders and a Spence was placed above her forehead.  She was prepped and draped in the usual sterile fashion including insertion of a 16-Swedish Lorenzo catheter.  She had mentioned a boil in her perineal area and it was on her right buttock cheek and this was squeezed pus and blood was exuded from the boil that she had in that area.  We infiltrated the supraumbilical area with 0.5% Marcaine with epinephrine 25 cm above the symphysis pubis in the midline.  We did this to the left of the midline in one of the lateral port places that I had marked out the previously and through this, a small nick was made in the skin and a Veress needle was introduced into the peritoneal cavity.  Once the pneumoperitoneum was established, the Veress needle was removed and replaced with an 8 mm da Albina port.  The camera was then introduced confirming intraperitoneal position and showing filmy, stringy adhesions of the omentum to the anterior abdominal wall.  Under direct visualization, 4 additional port sites were placed, an 8 mm da Albina port  was placed in the midline at 25 cm, one was placed 8 cm to the right of the midline port and one was placed 8 cm to the left of the midline port.  We then took down the filmy adhesions with EndoShears.  We then placed the patient in steep Trendelenburg and placed an 8 mm AirSeal port above the right anterior superior iliac spine and an accessory port above this and in line with the other ports.  This was a 12 mm VersaStep port because we felt that we would need the paddle retractor because of the redundancy of her bowel mesentery.  The robot was docked in the usual fashion.  Monopolar scissors were placed in the right upper robotic arm, a Maryland bipolar in the medial left upper robotic arm and a ProGrasp in the lateral left robotic arm.  The instruments were advanced into the pelvis. The paddle retractor was utilized to move the bowel out of the way.  The right round ligament was grasped and elevated with a ProGrasp was cauterized with the Maryland bipolar and divided with the monopolar scissors.  We then opened up the posterior broad ligament peritoneum lateral to the ovarian vessels.  We isolated the ovarian vessels by creating a defect of the medial aspect of the posterior broad ligament peritoneum below them and extending it towards the uterus.  The ovarian vessels were cauterized in a wide centimeter band.  They were then divided with the monopolar scissors.  We then spent some time doing lysis of adhesions getting rid of the filmy adhesions in the pelvis between the sigmoid colon, posterior uterus and adnexal structures as well as the left pelvic sidewall.  We then went to the left side and performed an identical procedure that we had on the right hand side.  The vesicouterine peritoneum was then divided and the bladder was taken down easily off the anterior surface of the cervix and upper vagina.  On both sides, the soft tissues at the isthmus were cauterized and divided with a combination of the Maryland  bipolar and the monopolar scissors and then on both sides, we cauterized and eventually once they were both cauterized  divided  the uterine arteries.  We then went down each cardinal ligament sequentially cauterizing and dividing the soft tissues away from the cervix down to and including the uterosacral ligament.  When this was completed on both sides, we then pushed the EA sizer into the anterior fornix.  The anterior colpotomy was made at the cervicovaginal junction and circumferentially the cervix was freed up from the vagina using the monopolar scissors. A single-tooth tenaculum was brought through the colpotomy and the cervix was grasped.  The cervix, uterus, tubes and ovaries were removed without incident and sent to pathology who was subsequently called and said that there was no definite malignancy present.  The vaginal cuff was closed with #1  0 PDS suture from the lateral angles, incorporating the uterosacral ligament and the cul-de-sac peritoneum and doing a full thickness anterior to posterior running closure towards the midline from either side.  The 2 sutures were tied together in the middle. The pelvis was then irrigated.  Excellent hemostasis had been obtained.  We used a 0 Vicryl suture and a fascial closure device to close the fascia of the 12 mm trocar site.  We removed the robotic instruments allowed the pneumoperitoneum to dissipate.  The robot was undocked and the trocars were removed intact.  The incisions were closed with a combination 4-0 Monocryl in an interrupted fashion to reapproximate the subcutaneous tissue and 4-0 Monocryl in a subcuticular fashion to reapproximate the skin.  This was reinforced by interrupted 4-0 Monocryl sutures across the incision given the size of her belly.  At that point her anesthesia was reversed.  She was extubated and taken to recovery room in stable condition.  Estimated blood loss 50 mL.      Cass Neil was my primary assist.  She helped with all aspects  of the case including insertion of the trocars, docking of the robot, placement of the robotic instruments.  She assisted through the accessory port site.  She was instrumental in specimen retrieval and helped with undocking of the port site, cuff closure and port site closure.         NHUNG SANABRIA MD             D: 2018   T: 2018   MT: HUMBERTO      Name:     GISEL DAMIAN   MRN:      [id number]        Account:        [id number]   :      1960           Procedure Date: 2018      Document: [id number]       cc: Nhung Castaneda DO

## 2018-06-07 NOTE — DISCHARGE SUMMARY
"HOSPITAL DISCHARGE SUMMARY    Patient Name: Merlyn Kirby  YOB: 1960 Age: 57 year old  Medical Record Number: [id number]  Primary Physician: Kailee Biggs  Phone: [phone]  Admission Date: 6/7/2018  Discharge Date: 6/8/18    Merlyn Kirby  will be discharged from [facility].    PRINCIPAL DISCHARGE DIAGNOSIS: CAH     BRIEF HOSPITAL COURSE: This 57 year old female admitted following the below listed procedure. She tolerated the procedure well. Uneventful post operative course and discharge to home on POD #1 with adequate pain control, tolerating orals, voiding and ambulating. She was discharged with a 10 day supply of Keflex for a boil on her right inner thigh.     PROCEDURES PERFORMED DURING HOSPITALIZATION:   Robotic assisted laparoscopic  Total hysterectomy  Bilateral salpingo-oophorectomy    COMPLICATIONS IN HOSPITAL: None    CONSULTATIONS:  None    PERTINENT FINDINGS/RESULTS AT DISCHARGE:   /90  Temp 96.3  F (35.7  C) (Oral)  Resp 16  Ht 1.6 m (5' 3\")  Wt 111.1 kg (244 lb 14.4 oz)  SpO2 96%  Breastfeeding? No  BMI 43.38 kg/m2    Latest Laboratory Results:  Chem:  CBC RESULTS: No results for input(s): WBC, RBC, HGB, HCT, MCV, MCH, MCHC, RDW, PLT in the last 31626 hours.  Last Basic Metabolic Panel:  No results found for: NA   Lab Results   Component Value Date    POTASSIUM 3.6 06/07/2018     No results found for: CHLORIDE  No results found for: MARTINA  No results found for: CO2  No results found for: BUN  No results found for: CR  No results found for: GLC      IMPORTANT PENDING TEST RESULTS:  Pathology    CONDITION AT DISCHARGE:    Stabilized    DISCHARGE ORDERS  Current Discharge Medication List      START taking these medications    Details   cephALEXin (KEFLEX) 500 MG capsule Take 1 capsule (500 mg) by mouth every 6 hours for 10 days  Qty: 40 capsule, Refills: 0    Associated Diagnoses: Complex atypical endometrial hyperplasia    "   HYDROcodone-acetaminophen (NORCO) 5-325 MG per tablet Take 1-2 tablets by mouth every 4 hours as needed for pain maximum 10 tablet(s) per day  Qty: 15 tablet, Refills: 0    Associated Diagnoses: Complex atypical endometrial hyperplasia      ibuprofen (ADVIL/MOTRIN) 600 MG tablet Take 1 tablet (600 mg) by mouth every 6 hours as needed for moderate pain  Qty: 40 tablet, Refills: 1    Associated Diagnoses: Complex atypical endometrial hyperplasia      senna (SENOKOT) 8.6 MG tablet Take 1-3 tablets by mouth 2 times daily as needed for constipation  Qty: 30 tablet, Refills: 0    Associated Diagnoses: Complex atypical endometrial hyperplasia         CONTINUE these medications which have NOT CHANGED    Details   doxycycline monohydrate 100 MG capsule Take 100 mg by mouth daily      HYDROCHLOROTHIAZIDE PO Take 50 mg by mouth daily      Levothyroxine Sodium (SYNTHROID PO) Take 100 mcg by mouth daily      Losartan Potassium (COZAAR PO) Take 50 mg by mouth daily      Metoprolol Succinate (TOPROL XL PO) Take 50 mg by mouth daily             DISCHARGE INSTRUCTION.      FOLLOW-UP: Merlyn Kirby should see Kailee Biggs.    Specialty follow-up: as above.     AFTER HOSPITAL RECOMMENDATIONS  As above.      Physician(s) in addition to primary physician who should receive a copy:  Kailee Biggs

## 2018-06-07 NOTE — PROCEDURES
POST OPERATIVE NOTE-IMMEDIATE :  Preoperative Diagnosis:  COMPLEX ENDOMETRIAL HYPERPLASIA AND POSSIBLE ATYPIA     Postoperative Diagnosis:  Same, no atypia     NATIONAL GUIDELINE REFERENCED FOR TREATMENT PLANNING:NCCN    Procedures:  Robotic assisted laparoscopic total hysterectomy  Bilateral salpingo oophorectomy    Prosthetic Devices:  None    Surgeon(s) and Assistants (if any):  Surgeon(s):  Martha Mayes MD  Circulator: Casey Horowitz RN  Relief Circulator: Sandra Kumar RN; Alka Griffin RN  Relief Scrub: Marge Crisostomo  Scrub Person: Carrie Brambila  First Assistant: Cass Neil APRN CNP    Anesthesia:  General    Drains:  none    Specimens:  Uterus, cervix, bilateral fallopian tubes and ovaries     Complications: none    Findings/Conclusions: Benign endometrium on frozen.     Estimated Blood Loss: 5cc     Condition on discharge from OR:  Satisfactory      Cass Neil   On Behalf of  Surgeon(s):  Martha Mayes MD

## 2018-06-07 NOTE — IP AVS SNAPSHOT
[address], [address]    [place] [address]    Phone:  [phone]                                       After Visit Summary   6/7/2018    Merlyn Kirby    MRN: [id number]           After Visit Summary Signature Page     I have received my discharge instructions, and my questions have been answered. I have discussed any challenges I see with this plan with the nurse or doctor.    ..........................................................................................................................................  Patient/Patient Representative Signature      ..........................................................................................................................................  Patient Representative Print Name and Relationship to Patient    ..................................................               ................................................  Date                                            Time    ..........................................................................................................................................  Reviewed by Signature/Title    ...................................................              ..............................................  Date                                                            Time

## 2018-06-07 NOTE — OR NURSING
Abdominal binder applied to patient to help with pain control.  New ice packs applied.  Patient much improved with pain at this time.

## 2018-06-07 NOTE — ANESTHESIA POSTPROCEDURE EVALUATION
Patient: Merlyn Kirby    Procedure(s):  ROBOTIC ASSISTED XI TOTAL LAPAROSCOPIC HYSTERECTOMY; BILATERAL SALPINGO-OOPHORECTOMY - Wound Class: II-Clean Contaminated    Diagnosis:COMPLEX ENDOMETRIAL HYPERPLASIA AND POSSIBLE ATYPIA   Diagnosis Additional Information: No value filed.    Anesthesia Type:  General, ETT    Note:  Anesthesia Post Evaluation    Patient location during evaluation: Bedside  Patient participation: Able to fully participate in evaluation  Level of consciousness: awake and alert  Pain management: adequate  Airway patency: patent  Cardiovascular status: acceptable  Respiratory status: acceptable  Hydration status: acceptable  PONV: none             Last vitals:  Vitals:    06/07/18 1620 06/07/18 1629 06/07/18 1648   BP: (!) 135/36  127/57   Resp: 13  16   Temp:   35.4  C (95.8  F)   SpO2:  97% 95%         Electronically Signed By: Duc Mitchell MD  June 7, 2018  5:41 PM

## 2018-06-08 VITALS
DIASTOLIC BLOOD PRESSURE: 49 MMHG | WEIGHT: 244.9 LBS | RESPIRATION RATE: 16 BRPM | OXYGEN SATURATION: 96 % | TEMPERATURE: 96.8 F | HEIGHT: 63 IN | SYSTOLIC BLOOD PRESSURE: 96 MMHG | BODY MASS INDEX: 43.39 KG/M2

## 2018-06-08 LAB — COPATH REPORT: NORMAL

## 2018-06-08 PROCEDURE — 25000128 H RX IP 250 OP 636: Performed by: NURSE PRACTITIONER

## 2018-06-08 PROCEDURE — 25000132 ZZH RX MED GY IP 250 OP 250 PS 637: Performed by: NURSE PRACTITIONER

## 2018-06-08 RX ORDER — LOSARTAN POTASSIUM 50 MG/1
50 TABLET ORAL DAILY
Status: DISCONTINUED | OUTPATIENT
Start: 2018-06-08 | End: 2018-06-08 | Stop reason: HOSPADM

## 2018-06-08 RX ORDER — METOPROLOL SUCCINATE 50 MG/1
50 TABLET, EXTENDED RELEASE ORAL DAILY
Status: DISCONTINUED | OUTPATIENT
Start: 2018-06-08 | End: 2018-06-08 | Stop reason: HOSPADM

## 2018-06-08 RX ORDER — HYDROCHLOROTHIAZIDE 50 MG/1
50 TABLET ORAL DAILY
Status: DISCONTINUED | OUTPATIENT
Start: 2018-06-08 | End: 2018-06-08 | Stop reason: HOSPADM

## 2018-06-08 RX ORDER — FLUCONAZOLE 150 MG/1
150 TABLET ORAL ONCE
Qty: 1 TABLET | Refills: 0 | Status: SHIPPED | OUTPATIENT
Start: 2018-06-08 | End: 2018-06-08

## 2018-06-08 RX ADMIN — HYDROCODONE BITARTRATE AND ACETAMINOPHEN 2 TABLET: 5; 325 TABLET ORAL at 11:30

## 2018-06-08 RX ADMIN — HYDROMORPHONE HYDROCHLORIDE 0.2 MG: 1 INJECTION, SOLUTION INTRAMUSCULAR; INTRAVENOUS; SUBCUTANEOUS at 04:09

## 2018-06-08 RX ADMIN — HYDROMORPHONE HYDROCHLORIDE 0.2 MG: 1 INJECTION, SOLUTION INTRAMUSCULAR; INTRAVENOUS; SUBCUTANEOUS at 00:12

## 2018-06-08 RX ADMIN — HYDROCODONE BITARTRATE AND ACETAMINOPHEN 2 TABLET: 5; 325 TABLET ORAL at 06:43

## 2018-06-08 NOTE — PLAN OF CARE
Problem: Patient Care Overview  Goal: Plan of Care/Patient Progress Review  Outcome: No Change  Pt A&O x4. POD0 hysterectomy/oopherectomy. C/o 5/10 pain gave Dilaudid 0.2 x2 and Norco x1 with relief. VSS. 2L NC, Capno. PIV infusing NS @ TKO. Using CPAP from home overnight, on continuous pulse ox on-- need MD order placed on day shift for CPAP use. IS at bedside, teaching provided. A1 has only walked in room. Voided x2 since jennifer MATHIAS, BS active in all 4 Q, no BM yet, tolerating regular diet. 6 lap sites with steri strips CDI, abdominal binder on. DC date pending.

## 2018-06-08 NOTE — PLAN OF CARE
Problem: Surgery Nonspecified (Adult)  Goal: Signs and Symptoms of Listed Potential Problems Will be Absent, Minimized or Managed (Surgery Nonspecified)  Signs and symptoms of listed potential problems will be absent, minimized or managed by discharge/transition of care (reference Surgery Nonspecified (Adult) CPG).   Outcome: No Change  POD 1. A/Ox4. C/o 8/10 pain, requesting PRN dilaudid given x2, effective. Refusing Capno, on continuous pulse ox satting 96-97%. VSS on CPAP overnight. HR graciela at times, pt reports this as baseline. 6 lap sites with steri strips, abdominal binder in place. Small amount of vaginal bleeding noted on green pad. Denies nausea. BS +, regular diet. L PIV SL. SBA to bathroom. Discharge pending. Continue to monitor.

## 2018-06-08 NOTE — PROGRESS NOTES
Pt discharged to home today with  providing transportation.  Pt was given filled scripts for all new medications.  Reviewed discharge instructions with pt and she expressed understanding.  Pt left floor via WC with RN.

## 2018-06-08 NOTE — PROGRESS NOTES
Doing well.  Discussed intra-op findings and procedure.  Discharge instructions reviewed.  Incision sites fine.   Home today.    MICHAEL Mayes M.D.

## (undated) DEVICE — SPONGE RAY-TEC 4X4" 7317

## (undated) DEVICE — DAVINCI XI NDL DRIVER LARGE 470006

## (undated) DEVICE — ADH LIQUID MASTISOL TOPICAL VIAL 2-3ML 0523-48

## (undated) DEVICE — SU PDS II 0 CT-2 27" Z334H

## (undated) DEVICE — DAVINCI XI MONOPOLAR SCISSORS HOT SHEARS 8MM 470179

## (undated) DEVICE — TUBING CONMED AIRSEAL SMOKE EVAC INSUFFLATION ASM-EVAC

## (undated) DEVICE — DAVINCI XI GRASPER ENDOWRIST PROGRASP 470093

## (undated) DEVICE — ESU GROUND PAD UNIVERSAL W/O CORD

## (undated) DEVICE — SUCTION IRR STRYKERFLOW II W/TIP 250-070-520

## (undated) DEVICE — DAVINCI XI DRAPE COLUMN 470341

## (undated) DEVICE — ENDO SHEARS 5MM 176643

## (undated) DEVICE — SU ETHIBOND 1 CT-1 30" X425H

## (undated) DEVICE — DRSG GAUZE 4X4" 3033

## (undated) DEVICE — KIT PATIENT POSITIONING PIGAZZI LATEX FREE 40580

## (undated) DEVICE — GLOVE PROTEXIS BLUE W/NEU-THERA 7.0  2D73EB70

## (undated) DEVICE — DRAPE LEGGINGS 8421

## (undated) DEVICE — SOL NACL 0.9% INJ 1000ML BAG 2B1324X

## (undated) DEVICE — CATH TRAY FOLEY SURESTEP 16FR WDRAIN BAG STLK LATEX A300316A

## (undated) DEVICE — PACK DAVINCI GYN SMA15GDFS1

## (undated) DEVICE — ENDO TROCAR CONMED AIRSEAL BLADELESS 08X120MM IAS8-120LP

## (undated) DEVICE — GLOVE BIOGEL PI ULTRATOUCH G SZ 6.5 42165

## (undated) DEVICE — DRSG STERI STRIP 1X5" R1548

## (undated) DEVICE — DRAPE CV SPLIT II 147X106" 9158

## (undated) DEVICE — DAVINCI XI DRAPE ARM 470015

## (undated) DEVICE — LINEN TOWEL PACK X5 5464

## (undated) DEVICE — ESU CORD MONOPOLAR 10'  E0510

## (undated) DEVICE — NDL INSUFFLATION 120MM VERRES 172015

## (undated) DEVICE — DAVINCI XI ESU FCP BIPOLAR MARYLAND 470172

## (undated) DEVICE — SOL WATER IRRIG 1000ML BOTTLE 2F7114

## (undated) DEVICE — SYR 50ML CATH TIP W/O NDL 309620

## (undated) DEVICE — GLOVE PROTEXIS MICRO 6.5  2D73PM65

## (undated) DEVICE — GLOVE PROTEXIS W/NEU-THERA 6.5  2D73TE65

## (undated) DEVICE — SOL NACL 0.9% IRRIG 1000ML BOTTLE 07138-09

## (undated) DEVICE — SU MONOCRYL 4-0 PS-2 18" UND Y496G

## (undated) DEVICE — CLIP ENDO HEMO-LOC PURPLE LG 544240

## (undated) DEVICE — DAVINCI XI SEAL UNIVERSAL 5-8MM 470361

## (undated) DEVICE — SPONGE LAP 18X18" X8435

## (undated) DEVICE — SUCTION CANISTER MEDIVAC LINER 3000ML W/LID 65651-530

## (undated) DEVICE — ENDO RETRACTOR PADDLE 12MM  173046

## (undated) DEVICE — DAVINCI XI OBTURATOR BLADELESS 8MM 470359

## (undated) DEVICE — DAVINCI HOT SHEARS TIP COVER  400180

## (undated) DEVICE — ENDO TROCAR 12MM VERSASTEP VS101012P

## (undated) RX ORDER — DEXAMETHASONE SODIUM PHOSPHATE 4 MG/ML
INJECTION, SOLUTION INTRA-ARTICULAR; INTRALESIONAL; INTRAMUSCULAR; INTRAVENOUS; SOFT TISSUE
Status: DISPENSED
Start: 2018-06-07

## (undated) RX ORDER — ONDANSETRON 2 MG/ML
INJECTION INTRAMUSCULAR; INTRAVENOUS
Status: DISPENSED
Start: 2018-06-07

## (undated) RX ORDER — PROPOFOL 10 MG/ML
INJECTION, EMULSION INTRAVENOUS
Status: DISPENSED
Start: 2018-06-07

## (undated) RX ORDER — FENTANYL CITRATE 50 UG/ML
INJECTION, SOLUTION INTRAMUSCULAR; INTRAVENOUS
Status: DISPENSED
Start: 2018-06-07

## (undated) RX ORDER — CEFAZOLIN SODIUM 1 G/50ML
SOLUTION INTRAVENOUS
Status: DISPENSED
Start: 2018-06-07

## (undated) RX ORDER — NEOSTIGMINE METHYLSULFATE 1 MG/ML
VIAL (ML) INJECTION
Status: DISPENSED
Start: 2018-06-07

## (undated) RX ORDER — BUPIVACAINE HYDROCHLORIDE AND EPINEPHRINE 5; 5 MG/ML; UG/ML
INJECTION, SOLUTION EPIDURAL; INTRACAUDAL; PERINEURAL
Status: DISPENSED
Start: 2018-06-07

## (undated) RX ORDER — HYDROMORPHONE HYDROCHLORIDE 1 MG/ML
INJECTION, SOLUTION INTRAMUSCULAR; INTRAVENOUS; SUBCUTANEOUS
Status: DISPENSED
Start: 2018-06-07

## (undated) RX ORDER — ALBUTEROL SULFATE 90 UG/1
AEROSOL, METERED RESPIRATORY (INHALATION)
Status: DISPENSED
Start: 2018-06-07

## (undated) RX ORDER — ACETAMINOPHEN 500 MG
TABLET ORAL
Status: DISPENSED
Start: 2018-06-07

## (undated) RX ORDER — SCOLOPAMINE TRANSDERMAL SYSTEM 1 MG/1
PATCH, EXTENDED RELEASE TRANSDERMAL
Status: DISPENSED
Start: 2018-06-07

## (undated) RX ORDER — GLYCOPYRROLATE 0.2 MG/ML
INJECTION, SOLUTION INTRAMUSCULAR; INTRAVENOUS
Status: DISPENSED
Start: 2018-06-07

## (undated) RX ORDER — LIDOCAINE HYDROCHLORIDE 20 MG/ML
INJECTION, SOLUTION EPIDURAL; INFILTRATION; INTRACAUDAL; PERINEURAL
Status: DISPENSED
Start: 2018-06-07

## (undated) RX ORDER — KETOROLAC TROMETHAMINE 30 MG/ML
INJECTION, SOLUTION INTRAMUSCULAR; INTRAVENOUS
Status: DISPENSED
Start: 2018-06-07

## (undated) RX ORDER — ALBUMIN, HUMAN INJ 5% 5 %
SOLUTION INTRAVENOUS
Status: DISPENSED
Start: 2018-06-07